# Patient Record
Sex: MALE | Race: BLACK OR AFRICAN AMERICAN
[De-identification: names, ages, dates, MRNs, and addresses within clinical notes are randomized per-mention and may not be internally consistent; named-entity substitution may affect disease eponyms.]

---

## 2019-07-17 ENCOUNTER — HOSPITAL ENCOUNTER (EMERGENCY)
Dept: HOSPITAL 61 - ER | Age: 19
Discharge: HOME | End: 2019-07-17
Payer: SELF-PAY

## 2019-07-17 VITALS — SYSTOLIC BLOOD PRESSURE: 123 MMHG | DIASTOLIC BLOOD PRESSURE: 63 MMHG

## 2019-07-17 VITALS — HEIGHT: 71 IN | WEIGHT: 138 LBS | BODY MASS INDEX: 19.32 KG/M2

## 2019-07-17 DIAGNOSIS — Z20.2: ICD-10-CM

## 2019-07-17 DIAGNOSIS — N30.01: Primary | ICD-10-CM

## 2019-07-17 LAB
APTT PPP: YELLOW S
BACTERIA #/AREA URNS HPF: (no result) /HPF
BILIRUB UR QL STRIP: NEGATIVE
FIBRINOGEN PPP-MCNC: (no result) MG/DL
NITRITE UR QL STRIP: NEGATIVE
PH UR STRIP: 6 [PH]
PROT UR STRIP-MCNC: NEGATIVE MG/DL
RBC #/AREA URNS HPF: (no result) /HPF (ref 0–2)
SQUAMOUS #/AREA URNS LPF: (no result) /LPF
UROBILINOGEN UR-MCNC: 1 MG/DL
WBC #/AREA URNS HPF: (no result) /HPF (ref 0–4)

## 2019-07-17 PROCEDURE — 81001 URINALYSIS AUTO W/SCOPE: CPT

## 2019-07-17 PROCEDURE — 87086 URINE CULTURE/COLONY COUNT: CPT

## 2019-07-17 PROCEDURE — 87591 N.GONORRHOEAE DNA AMP PROB: CPT

## 2019-07-17 PROCEDURE — 96372 THER/PROPH/DIAG INJ SC/IM: CPT

## 2019-07-17 PROCEDURE — 87491 CHLMYD TRACH DNA AMP PROBE: CPT

## 2019-07-17 PROCEDURE — 99284 EMERGENCY DEPT VISIT MOD MDM: CPT

## 2019-07-17 RX ADMIN — CEFTRIAXONE ONE MG: 250 INJECTION, POWDER, FOR SOLUTION INTRAMUSCULAR; INTRAVENOUS at 04:45

## 2019-07-17 RX ADMIN — AZITHROMYCIN ONE MG: 250 TABLET, FILM COATED ORAL at 04:45

## 2019-07-17 NOTE — PHYS DOC
Past Medical History


Past Medical History:  No Pertinent History


Past Surgical History:  No Surgical History


Additional Information:  


Nonsmoker


Alcohol Use:  None


Drug Use:  None





Adult General


Chief Complaint


Chief Complaint:  SEXUALLY TRANSMITTED DISEASE





HPI


HPI





Patient is a 18  year old [f__sex] who presents with []





Review of Systems


Review of Systems





Constitutional: Denies fever or chills []


Eyes: Denies change in visual acuity, redness, or eye pain []


HENT: Denies nasal congestion or sore throat []


Respiratory: Denies cough or shortness of breath []


Cardiovascular: No additional information not addressed in HPI []


GI: Denies abdominal pain, nausea, vomiting, bloody stools or diarrhea []


: Denies dysuria or hematuria []


Musculoskeletal: Denies back pain or joint pain []


Integument: Denies rash or skin lesions []


Neurologic: Denies headache, focal weakness or sensory changes []


Endocrine: Denies polyuria or polydipsia []





All other systems were reviewed and found to be within normal limits, except as 

documented in this note.





Current Medications


Current Medications





Current Medications








 Medications


  (Trade)  Dose


 Ordered  Sig/Asa  Start Time


 Stop Time Status Last Admin


Dose Admin


 


 Azithromycin


  (Zithromax)  250 mg  STK-MED ONCE  7/17/19 04:45


 7/17/19 04:46 DC  





 


 Ceftriaxone Sodium


  (Rocephin Im)  250 mg  STK-MED ONCE  7/17/19 04:45


 7/17/19 04:46 DC  














Allergies


Allergies





Allergies








Coded Allergies Type Severity Reaction Last Updated Verified


 


  No Known Drug Allergies    9/9/14 No











Physical Exam


Physical Exam





Constitutional: Well developed, well nourished, no acute distress, non-toxic 

appearance. []


HENT: Normocephalic, atraumatic, bilateral external ears normal, oropharynx 

moist, no oral exudates, nose normal. []


Eyes: PERRLA, EOMI, conjunctiva normal, no discharge. [] 


Neck: Normal range of motion, no tenderness, supple, no stridor. [] 


Cardiovascular:Heart rate regular rhythm, no murmur []


Lungs & Thorax:  Bilateral breath sounds clear to auscultation []


Abdomen: Bowel sounds normal, soft, no tenderness, no masses, no pulsatile 

masses. [] 


Skin: Warm, dry, no erythema, no rash. [] 


Back: No tenderness, no CVA tenderness. [] 


Extremities: No tenderness, no cyanosis, no clubbing, ROM intact, no edema. [] 


Neurologic: Alert and oriented X 3, normal motor function, normal sensory 

function, no focal deficits noted. []


Psychologic: Affect normal, judgement normal, mood normal. []





Current Patient Data


Vital Signs





                                   Vital Signs








  Date Time  Temp Pulse Resp B/P (MAP) Pulse Ox O2 Delivery O2 Flow Rate FiO2


 


7/17/19 04:10 98.0 64 14 123/63 (83) 99 Room Air  





 98.0       








Lab Values





                                Laboratory Tests








Test


 7/17/19


04:30


 


Urine Collection Type Unknown  


 


Urine Color Yellow  


 


Urine Clarity Cloudy  


 


Urine pH 6.0  


 


Urine Specific Gravity >=1.030  


 


Urine Protein


 Negative mg/dL


(NEG-TRACE)


 


Urine Glucose (UA)


 Negative mg/dL


(NEG)


 


Urine Ketones (Stick)


 Negative mg/dL


(NEG)


 


Urine Blood Trace (NEG)  


 


Urine Nitrite


 Negative (NEG)





 


Urine Bilirubin


 Negative (NEG)





 


Urine Urobilinogen Dipstick


 1.0 mg/dL (0.2


mg/dL)


 


Urine Leukocyte Esterase Large (NEG)  


 


Urine RBC


 Occ /HPF (0-2)





 


Urine WBC


 Tntc /HPF


(0-4)


 


Urine Squamous Epithelial


Cells None /LPF  





 


Urine Bacteria


 Moderate /HPF


(0-FEW)











EKG


EKG


[]





Radiology/Procedures


Radiology/Procedures


[]





Course & Med Decision Making


Course & Med Decision Making


Pertinent Labs and Imaging studies reviewed. (See chart for details)





[]





Dragon Disclaimer


Dragon Disclaimer


This electronic medical record was generated, in whole or in part, using a voice

 recognition dictation system.





Departure


Departure


Impression:  


   Primary Impression:  


   Concern about STD in male without diagnosis


   Additional Impression:  


   UTI (urinary tract infection)


Disposition:  01 HOME, SELF-CARE


Condition:  STABLE


Referrals:  


MICHAEL AUGUSTE MD (PCP)


Patient Instructions:  Sexually Transmitted Disease, Easy-to-Read, Urinary Tract

 Infection, Easy-to-Read





Additional Instructions:  


Please refrain from any sexual activity.  You have received treatment for 

Chlamydia and Gonorrhea.  You will be notified if these cultures come back 

positive.


Scripts


Cephalexin (KEFLEX) 500 Mg Capsule


500 MG PO TID for 7 Days, #21 CAP


   Prov: DE LOS SANTOSELLA DO         7/17/19





Problem Qualifiers








   Additional Impression:  


   UTI (urinary tract infection)


   Urinary tract infection type:  acute cystitis  Hematuria presence:  with 

   hematuria  Qualified Codes:  N30.01 - Acute cystitis with hematuria








ELLA DE LOS SANTOS DO             Jul 17, 2019 04:47

## 2019-07-24 ENCOUNTER — HOSPITAL ENCOUNTER (EMERGENCY)
Dept: HOSPITAL 61 - ER | Age: 19
Discharge: HOME | End: 2019-07-24
Payer: SELF-PAY

## 2019-07-24 VITALS — SYSTOLIC BLOOD PRESSURE: 120 MMHG | DIASTOLIC BLOOD PRESSURE: 75 MMHG

## 2019-07-24 VITALS — BODY MASS INDEX: 19.32 KG/M2 | HEIGHT: 71 IN | WEIGHT: 138 LBS

## 2019-07-24 DIAGNOSIS — R30.0: Primary | ICD-10-CM

## 2019-07-24 DIAGNOSIS — Z20.2: ICD-10-CM

## 2019-07-24 LAB
APTT PPP: YELLOW S
BACTERIA #/AREA URNS HPF: 0 /HPF
BILIRUB UR QL STRIP: NEGATIVE
FIBRINOGEN PPP-MCNC: CLEAR MG/DL
NITRITE UR QL STRIP: NEGATIVE
PH UR STRIP: 6 [PH]
PROT UR STRIP-MCNC: NEGATIVE MG/DL
RBC #/AREA URNS HPF: (no result) /HPF (ref 0–2)
SQUAMOUS #/AREA URNS LPF: (no result) /LPF
UROBILINOGEN UR-MCNC: 0.2 MG/DL
WBC #/AREA URNS HPF: (no result) /HPF (ref 0–4)

## 2019-07-24 PROCEDURE — 87591 N.GONORRHOEAE DNA AMP PROB: CPT

## 2019-07-24 PROCEDURE — 87491 CHLMYD TRACH DNA AMP PROBE: CPT

## 2019-07-24 PROCEDURE — 96374 THER/PROPH/DIAG INJ IV PUSH: CPT

## 2019-07-24 PROCEDURE — 81001 URINALYSIS AUTO W/SCOPE: CPT

## 2019-07-24 PROCEDURE — 96372 THER/PROPH/DIAG INJ SC/IM: CPT

## 2019-07-24 PROCEDURE — 99284 EMERGENCY DEPT VISIT MOD MDM: CPT

## 2019-07-24 NOTE — PHYS DOC
Past Medical History


Past Medical History:  No Pertinent History


Past Surgical History:  No Surgical History


Alcohol Use:  None


Drug Use:  None





Adult General


Chief Complaint


Chief Complaint:  URINARY FREQUENCY





HPI


HPI





Patient is a 18  year old male who presents with STD concern. Patient states he 

was seen in the ED with typical for STD concerns, he states he was treated, he 

states he went home and had unprotected intercourse with a new partner and now 

is experiencing the same symptoms including dysuria.





Review of Systems


Review of Systems





Constitutional: Denies fever or chills []





GI: Denies abdominal pain, nausea, vomiting, bloody stools or diarrhea []


: Reports dysuria and concern for STDs, denies hematuria []


Musculoskeletal: Denies back pain or joint pain []


Integument: Denies rash or skin lesions []


Neurologic: Denies headache, focal weakness or sensory changes []





All other systems were reviewed and found to be within normal limits, except as 

documented in this note.





Allergies


Allergies





Allergies








Coded Allergies Type Severity Reaction Last Updated Verified


 


  No Known Drug Allergies    9/9/14 No











Physical Exam


Physical Exam





Constitutional: Well developed, well nourished, no acute distress, non-toxic 

appearance. []


Abdomen: Bowel sounds normal, soft, no tenderness, no masses, no pulsatile m

asses. [] 


Skin: Warm, dry, no erythema, no rash. [] 


Back: No tenderness, no CVA tenderness. [] 


Extremities: No tenderness, no cyanosis, no clubbing, ROM intact, no edema. [] 


Neurologic: Alert and oriented X 3, normal motor function, normal sensory 

function, no focal deficits noted. []


Psychologic: Affect normal, judgement normal, mood normal. []





EKG


EKG


[]





Radiology/Procedures


Radiology/Procedures


[]





Course & Med Decision Making


Course & Med Decision Making


Pertinent Labs and Imaging studies reviewed. (See chart for details)





This is a 22-year-old male patient who presents to the ED today with dysuria and

 concern for STDs. Patient was treated a week ago for STDs and had unprotected 

sex with a female partner and now he is experiencing similar symptoms he had 

before he was treated.





I spoke to patient at length about STDs. Recommended the abstinence from sex for

 2 weeks. He was treated again in the ED stay with Flagyl, azithromycin 1 g 

Rocephin 1 g and sent home with doxycycline. He was instructed to follow-up with

 the health department for further STD concerns.





Dragon Disclaimer


Dragon Disclaimer


This electronic medical record was generated, in whole or in part, using a voice

 recognition dictation system.





Departure


Departure


Impression:  


   Primary Impression:  


   Concern about STD in male without diagnosis


Disposition:  01 HOME, SELF-CARE


Condition:  STABLE


Referrals:  


NO PCP (PCP)


Follow-up with the health department for further concerns


Patient Instructions:  Sexually Transmitted Disease





Additional Instructions:  


You were treated in the emergency room for sexually transmitted diseases, please

 do not have any sex/intercourse for 2 weeks to give the medicines time to work 

and clear the infection. Use protection at all times. As discussed you need to 

contact all your sex partners and ask them to get treated for STDs. Follow-up 

with the health department for further STD concerns.


Scripts


Doxycycline Hyclate (DOXYCYCLINE HYCLATE) 100 Mg Tablet.


1 TAB PO BID, #14 TAB


   Prov: CANDICE GUEVARA         7/24/19











CANDICE GUEVARA              Jul 24, 2019 08:27

## 2019-12-20 ENCOUNTER — HOSPITAL ENCOUNTER (EMERGENCY)
Dept: HOSPITAL 61 - ER | Age: 19
Discharge: HOME | End: 2019-12-20
Payer: SELF-PAY

## 2019-12-20 VITALS — HEIGHT: 71 IN | WEIGHT: 143 LBS | BODY MASS INDEX: 20.02 KG/M2

## 2019-12-20 VITALS — DIASTOLIC BLOOD PRESSURE: 65 MMHG | SYSTOLIC BLOOD PRESSURE: 133 MMHG

## 2019-12-20 DIAGNOSIS — A08.4: Primary | ICD-10-CM

## 2019-12-20 PROCEDURE — 99283 EMERGENCY DEPT VISIT LOW MDM: CPT

## 2019-12-20 NOTE — PHYS DOC
Past Medical History


Past Medical History:  No Pertinent History


Past Surgical History:  No Surgical History


Alcohol Use:  None


Drug Use:  None





Adult General


Chief Complaint


Chief Complaint:  DIARRHEA





HPI


HPI





Patient is a 19  year old male patient without history of medical problem who 

presents with complaint of diarrhea. Patient states he ate chicken last night 

and had nausea and 2 episodes of vomiting and more than 6 episodes of diarrhea 

with cramping abdominal pain and rated his pain as a moderate pain. Patient 

denies fever and chills, decrease of urine output, sick contact.





Review of Systems


Review of Systems





Constitutional: Denies fever or chills []


Eyes: Denies change in visual acuity, redness, or eye pain []


HENT: Denies nasal congestion or sore throat []


Respiratory: Denies cough or shortness of breath []


Cardiovascular: No additional information not addressed in HPI []


GI: Reports abdominal pain, nausea, vomiting, diarrhea []


: Denies dysuria or hematuria []


Musculoskeletal: Denies back pain or joint pain []


Integument: Denies rash or skin lesions []


Neurologic: Denies headache, focal weakness or sensory changes []


Endocrine: Denies polyuria or polydipsia []





All other systems were reviewed and found to be within normal limits, except as 

documented in this note.





Current Medications


Current Medications





Current Medications








 Medications


  (Trade)  Dose


 Ordered  Sig/Asa  Start Time


 Stop Time Status Last Admin


Dose Admin


 


 Ondansetron HCl


  (Zofran Odt)  4 mg  1X  ONCE  12/20/19 10:15


 12/20/19 10:16 DC 12/20/19 10:21


4 MG











Allergies


Allergies





Allergies








Coded Allergies Type Severity Reaction Last Updated Verified


 


  No Known Drug Allergies    9/9/14 No











Physical Exam


Physical Exam








Constitutional: Well developed, well nourished, mild distress, non-toxic 

appearance. []


HENT: Normocephalic, atraumatic.


Eyes: PERRLA, EOMI, conjunctiva normal, no discharge. [] 


Neck: Normal range of motion, no tenderness, supple, no stridor. [] 


Cardiovascular:Heart rate regular rhythm, no murmur []


Lungs & Thorax:  Bilateral breath sounds clear to auscultation []


Abdomen: Bowel sounds normal, soft, no tenderness, no masses, no pulsatile 

masses. [] 


Skin: Warm, dry, no erythema, no rash. [] 


Back: No tenderness, no CVA tenderness. [] 


Extremities: No tenderness, no cyanosis, no clubbing, ROM intact, no edema. [] 


Neurologic: Alert and oriented X 3, no focal deficits noted. []


Psychologic: Affect normal, judgement normal, mood normal. []





Current Patient Data


Vital Signs





                                   Vital Signs








  Date Time  Temp Pulse Resp B/P (MAP) Pulse Ox O2 Delivery O2 Flow Rate FiO2


 


12/20/19 09:57 98.5 90 16 133/65 (87) 99 Room Air  





 98.5       











EKG


EKG


[]





Radiology/Procedures


Radiology/Procedures


[]





Course & Med Decision Making


Course & Med Decision Making


Evaluation of patient in ER showed 19-year-old male patient with complaining of 

nausea and vomiting and diarrhea and abdominal pain after eating a bad food. 

Patient had unremarkable physical exam. Patient felt better after Zofran 

sublingual. Plan discharge patient home to diagnose of gastroenteritis.





Dragon Disclaimer


Dragon Disclaimer


This electronic medical record was generated, in whole or in part, using a voice

 recognition dictation system.





Departure


Departure


Impression:  


   Primary Impression:  


   Viral gastroenteritis


Disposition:  01 HOME, SELF-CARE (@[id number])


Condition:  STABLE


Referrals:  


NO PCP (PCP)


Patient Instructions:  Viral Gastroenteritis





Additional Instructions:  


Drink plenty of liquids


Follow-up with your primary care physician in 3-5 days


Return to ER if not getting better


Do not eat solid food today





Thank you for visiting [facility]. We appreciate you trusting us 

with your care. If any additional problems come up don't hesitate to return to 

visit us. Please follow up with your primary care provider so they can plan 

additional care if needed and know about the problem that you had. If symptoms 

worsen come back to the Emergency Department. Any concerning symptoms that start

 such as chest pain, shortness of air, weakness or numbness on one side of the 

body, running high fevers or any other concerning symptoms return to the ER.


Scripts


Ondansetron Hcl (ZOFRAN) 4 Mg Tablet


1 TAB PO PRN Q6-8HRS for nausea, #12 TAB


   Prov: FAROOQ VASQUEZ MD         12/20/19











FAROOQ VASQUEZ MD             Dec 20, 2019 10:29

## 2020-01-22 ENCOUNTER — HOSPITAL ENCOUNTER (EMERGENCY)
Dept: HOSPITAL 61 - ER | Age: 20
Discharge: HOME | End: 2020-01-22
Payer: SELF-PAY

## 2020-01-22 VITALS — BODY MASS INDEX: 18.81 KG/M2 | WEIGHT: 138.89 LBS | HEIGHT: 72 IN

## 2020-01-22 VITALS — DIASTOLIC BLOOD PRESSURE: 62 MMHG | SYSTOLIC BLOOD PRESSURE: 144 MMHG

## 2020-01-22 DIAGNOSIS — R05: ICD-10-CM

## 2020-01-22 DIAGNOSIS — R53.83: ICD-10-CM

## 2020-01-22 DIAGNOSIS — J02.9: Primary | ICD-10-CM

## 2020-01-22 LAB
INFLUENZA A PATIENT: NEGATIVE
INFLUENZA B PATIENT: NEGATIVE

## 2020-01-22 PROCEDURE — 87880 STREP A ASSAY W/OPTIC: CPT

## 2020-01-22 PROCEDURE — 99285 EMERGENCY DEPT VISIT HI MDM: CPT

## 2020-01-22 PROCEDURE — 87804 INFLUENZA ASSAY W/OPTIC: CPT

## 2020-01-22 PROCEDURE — 87070 CULTURE OTHR SPECIMN AEROBIC: CPT

## 2020-01-22 PROCEDURE — 71046 X-RAY EXAM CHEST 2 VIEWS: CPT

## 2020-01-22 NOTE — PHYS DOC
Past Medical History


Past Medical History:  No Pertinent History


Past Surgical History:  No Surgical History


Alcohol Use:  None


Drug Use:  None





Adult General


Chief Complaint


Chief Complaint:  SORE THROAT





HPI


HPI





Patient is a 19  year old male patient who presents to the ED today complaining 

of sore throat cough and generalized tiredness, symptoms began 2 days ago. 

Patient denies any fever.





Review of Systems


Review of Systems





Constitutional: Reports fatigue. Denies fever or chills []


Eyes: Denies change in visual acuity, redness, or eye pain []


HENT: Reports sore throat. Denies nasal congestion 


Respiratory: Reports cough, denies shortness of breath []


Cardiovascular: No additional information not addressed in HPI []


GI: Denies abdominal pain, nausea, vomiting, bloody stools or diarrhea []


: Denies dysuria or hematuria []


Musculoskeletal: Denies back pain or joint pain []


Integument: Denies rash or skin lesions []


Neurologic: Denies headache, focal weakness or sensory changes []








All other systems were reviewed and found to be within normal limits, except as 

documented in this note.





Allergies


Allergies





Allergies








Coded Allergies Type Severity Reaction Last Updated Verified


 


  No Known Drug Allergies    9/9/14 No











Physical Exam


Physical Exam





Constitutional: Well developed, well nourished, no acute distress, non-toxic 

appearance. []


HENT: Normocephalic, atraumatic, bilateral external ears normal, oropharynx 

moist, no oral exudates, nose normal. []


Eyes: PERRLA, EOMI, conjunctiva normal, no discharge. [] 


Neck: Normal range of motion, no tenderness, supple, no stridor. [] 


Cardiovascular:Heart rate regular rhythm, no murmur []


Lungs & Thorax:  Bilateral breath sounds clear to auscultation []


Abdomen: Bowel sounds normal, soft, no tenderness, no masses, no pulsatile 

masses. [] 


Skin: Warm, dry, no erythema, no rash. [] 


Back: No tenderness, no CVA tenderness. [] 


Extremities: No tenderness, no cyanosis, no clubbing, ROM intact, no edema. [] 


Neurologic: Alert and oriented X 3, normal motor function, normal sensory 

function, no focal deficits noted. []


Psychologic: Affect normal, judgement normal, mood normal. []





Current Patient Data


Vital Signs





                                   Vital Signs








  Date Time  Temp Pulse Resp B/P (MAP) Pulse Ox O2 Delivery O2 Flow Rate FiO2


 


1/22/20 10:00 97.9 109 16 144/62 (89) 98 Room Air  





 97.9       








Lab Values





                                Laboratory Tests








Test


 1/22/20


06:24 1/22/20


10:24


 


Influenza Type A Antigen


 Negative


(NEGATIVE) 





 


Influenza Type B Antigen


 Negative


(NEGATIVE) 





 


Group A Streptococcus Rapid


 


 Negative


(NEGATIVE)











EKG


EKG


[]





Radiology/Procedures


Radiology/Procedures


[]





Course & Med Decision Making


Course & Med Decision Making


Pertinent Labs and Imaging studies reviewed. (See chart for details)





This is a 19-year-old male patient presenting to the ED today with cough sore 

throat and fatigue, symptoms began 2 days ago. Negative rapid strep. Negative 

influenza A or B. Negative chest x-ray as interpreted by radiologist. Patient's 

symptoms could be viral. Supportive care measures recommended. Discharged to 

home.





Dragon Disclaimer


Dragon Disclaimer


This electronic medical record was generated, in whole or in part, using a voice

 recognition dictation system.





Departure


Departure


Impression:  


   Primary Impression:  


   Cough


   Additional Impression:  


   Sore throat


Disposition:  01 HOME, SELF-CARE


Condition:  STABLE


Referrals:  


NO PCP (PCP)


follow up in 1 week with your doctor


Patient Instructions:  Cough, Adult, Easy-to-Read, Sore Throat, Easy-to-Read





Additional Instructions:  


You were evaluated in the emergency room, your chest x-ray is negative for any 

acute findings, your influenza test is negative, your strep test is negative. 

You could have a viral illness going on. We recommend you rest, push fluids, 

take over-the-counter cold and flu medications as needed. Follow up with your 

own doctor in the course of next week.





Problem Qualifiers











CANDICE GUEVARA              Jan 22, 2020 10:52

## 2020-01-22 NOTE — RAD
AP and Lateral Views of the Chest  1/22/2020 10:29 AM

 

Indication: Cough

 

Comparison: None

 

Findings: There is no focal consolidation or infiltrate identified. The 

cardiomediastinal silhouette is within normal limits. There is no evidence

of pneumothorax or pleural effusion. No acute osseous abnormalities are 

identified.  

 

Impression: No evidence of acute cardiopulmonary process. 

 

Electronically signed by: Damion Angel MD (1/22/2020 11:00 AM) [id number]